# Patient Record
Sex: FEMALE
[De-identification: names, ages, dates, MRNs, and addresses within clinical notes are randomized per-mention and may not be internally consistent; named-entity substitution may affect disease eponyms.]

---

## 2017-12-14 ENCOUNTER — RESULT REVIEW (OUTPATIENT)
Age: 57
End: 2017-12-14

## 2018-02-27 ENCOUNTER — APPOINTMENT (OUTPATIENT)
Dept: INTERNAL MEDICINE | Facility: CLINIC | Age: 58
End: 2018-02-27
Payer: COMMERCIAL

## 2018-02-27 VITALS
RESPIRATION RATE: 12 BRPM | SYSTOLIC BLOOD PRESSURE: 112 MMHG | BODY MASS INDEX: 25.95 KG/M2 | OXYGEN SATURATION: 98 % | HEART RATE: 88 BPM | TEMPERATURE: 100.1 F | WEIGHT: 141 LBS | DIASTOLIC BLOOD PRESSURE: 74 MMHG | HEIGHT: 62 IN

## 2018-02-27 DIAGNOSIS — J06.9 ACUTE UPPER RESPIRATORY INFECTION, UNSPECIFIED: ICD-10-CM

## 2018-02-27 DIAGNOSIS — Z87.891 PERSONAL HISTORY OF NICOTINE DEPENDENCE: ICD-10-CM

## 2018-02-27 PROCEDURE — 99204 OFFICE O/P NEW MOD 45 MIN: CPT

## 2018-02-28 LAB — RAPID RVP RESULT: NOT DETECTED

## 2018-03-05 ENCOUNTER — APPOINTMENT (OUTPATIENT)
Dept: INTERNAL MEDICINE | Facility: CLINIC | Age: 58
End: 2018-03-05
Payer: COMMERCIAL

## 2018-03-05 VITALS
HEIGHT: 62 IN | TEMPERATURE: 97.6 F | OXYGEN SATURATION: 99 % | HEART RATE: 72 BPM | WEIGHT: 145 LBS | BODY MASS INDEX: 26.68 KG/M2 | SYSTOLIC BLOOD PRESSURE: 111 MMHG | RESPIRATION RATE: 12 BRPM | DIASTOLIC BLOOD PRESSURE: 68 MMHG

## 2018-03-05 PROCEDURE — 99214 OFFICE O/P EST MOD 30 MIN: CPT

## 2018-05-03 ENCOUNTER — NON-APPOINTMENT (OUTPATIENT)
Age: 58
End: 2018-05-03

## 2018-05-03 ENCOUNTER — APPOINTMENT (OUTPATIENT)
Dept: CARDIOLOGY | Facility: CLINIC | Age: 58
End: 2018-05-03
Payer: COMMERCIAL

## 2018-05-03 VITALS
HEART RATE: 68 BPM | HEIGHT: 62 IN | RESPIRATION RATE: 12 BRPM | BODY MASS INDEX: 26.5 KG/M2 | DIASTOLIC BLOOD PRESSURE: 59 MMHG | WEIGHT: 144 LBS | OXYGEN SATURATION: 99 % | SYSTOLIC BLOOD PRESSURE: 97 MMHG

## 2018-05-03 DIAGNOSIS — R53.83 OTHER FATIGUE: ICD-10-CM

## 2018-05-03 DIAGNOSIS — Z85.820 PERSONAL HISTORY OF MALIGNANT MELANOMA OF SKIN: ICD-10-CM

## 2018-05-03 DIAGNOSIS — R06.02 SHORTNESS OF BREATH: ICD-10-CM

## 2018-05-03 DIAGNOSIS — Z87.01 PERSONAL HISTORY OF PNEUMONIA (RECURRENT): ICD-10-CM

## 2018-05-03 PROCEDURE — 93000 ELECTROCARDIOGRAM COMPLETE: CPT

## 2018-05-03 PROCEDURE — 99204 OFFICE O/P NEW MOD 45 MIN: CPT | Mod: 25

## 2018-05-03 RX ORDER — HYDROCODONE POLISTIREX AND CHLORPHENIRAMINE POLISTIREX 10; 8 MG/5ML; MG/5ML
10-8 SUSPENSION, EXTENDED RELEASE ORAL
Qty: 1 | Refills: 0 | Status: DISCONTINUED | COMMUNITY
Start: 2018-02-27 | End: 2018-05-03

## 2018-05-03 RX ORDER — LEVOFLOXACIN 500 MG/1
500 TABLET, FILM COATED ORAL
Qty: 7 | Refills: 0 | Status: DISCONTINUED | COMMUNITY
Start: 2018-03-05 | End: 2018-05-03

## 2018-05-03 RX ORDER — BENZONATATE 200 MG/1
200 CAPSULE ORAL
Qty: 20 | Refills: 0 | Status: DISCONTINUED | COMMUNITY
Start: 2018-02-27 | End: 2018-05-03

## 2018-05-03 RX ORDER — LEVOFLOXACIN 500 MG/1
500 TABLET, FILM COATED ORAL
Qty: 7 | Refills: 0 | Status: DISCONTINUED | COMMUNITY
Start: 2018-02-27 | End: 2018-05-03

## 2018-05-04 LAB
25(OH)D3 SERPL-MCNC: 23.9 NG/ML
ALBUMIN SERPL ELPH-MCNC: 4.4 G/DL
ALP BLD-CCNC: 55 U/L
ALT SERPL-CCNC: 24 U/L
ANION GAP SERPL CALC-SCNC: 10 MMOL/L
AST SERPL-CCNC: 34 U/L
BILIRUB SERPL-MCNC: 0.3 MG/DL
BUN SERPL-MCNC: 13 MG/DL
CALCIUM SERPL-MCNC: 9.3 MG/DL
CHLORIDE SERPL-SCNC: 103 MMOL/L
CHOLEST SERPL-MCNC: 245 MG/DL
CHOLEST/HDLC SERPL: 4.4 RATIO
CO2 SERPL-SCNC: 26 MMOL/L
CREAT SERPL-MCNC: 0.89 MG/DL
GLUCOSE SERPL-MCNC: 69 MG/DL
HBA1C MFR BLD HPLC: 5 %
HDLC SERPL-MCNC: 56 MG/DL
LDLC SERPL CALC-MCNC: 159 MG/DL
POTASSIUM SERPL-SCNC: 4.8 MMOL/L
PROT SERPL-MCNC: 8.2 G/DL
SODIUM SERPL-SCNC: 139 MMOL/L
TRIGL SERPL-MCNC: 150 MG/DL
TSH SERPL-ACNC: 3.86 UIU/ML
VIT B12 SERPL-MCNC: 473 PG/ML

## 2018-05-07 LAB
BASOPHILS # BLD AUTO: 0.04 K/UL
BASOPHILS NFR BLD AUTO: 0.8 %
EOSINOPHIL # BLD AUTO: 0.26 K/UL
EOSINOPHIL NFR BLD AUTO: 5.4 %
HCT VFR BLD CALC: 39.7 %
HGB BLD-MCNC: 12.6 G/DL
IMM GRANULOCYTES NFR BLD AUTO: 0.2 %
LYMPHOCYTES # BLD AUTO: 1.95 K/UL
LYMPHOCYTES NFR BLD AUTO: 40.4 %
MAN DIFF?: NORMAL
MCHC RBC-ENTMCNC: 30.3 PG
MCHC RBC-ENTMCNC: 31.7 GM/DL
MCV RBC AUTO: 95.4 FL
MONOCYTES # BLD AUTO: 0.43 K/UL
MONOCYTES NFR BLD AUTO: 8.9 %
NEUTROPHILS # BLD AUTO: 2.14 K/UL
NEUTROPHILS NFR BLD AUTO: 44.3 %
PLATELET # BLD AUTO: 244 K/UL
RBC # BLD: 4.16 M/UL
RBC # FLD: 14 %
WBC # FLD AUTO: 4.83 K/UL

## 2018-08-27 ENCOUNTER — APPOINTMENT (OUTPATIENT)
Dept: INTERNAL MEDICINE | Facility: CLINIC | Age: 58
End: 2018-08-27

## 2018-08-27 VITALS
BODY MASS INDEX: 25.58 KG/M2 | DIASTOLIC BLOOD PRESSURE: 75 MMHG | RESPIRATION RATE: 12 BRPM | OXYGEN SATURATION: 98 % | HEART RATE: 61 BPM | WEIGHT: 139 LBS | SYSTOLIC BLOOD PRESSURE: 112 MMHG | TEMPERATURE: 97.8 F | HEIGHT: 62 IN

## 2018-10-18 ENCOUNTER — RESULT REVIEW (OUTPATIENT)
Age: 58
End: 2018-10-18

## 2018-11-01 ENCOUNTER — RESULT REVIEW (OUTPATIENT)
Age: 58
End: 2018-11-01

## 2020-12-16 PROBLEM — J06.9 ACUTE UPPER RESPIRATORY INFECTION: Status: RESOLVED | Noted: 2018-02-27 | Resolved: 2020-12-16

## 2021-03-18 ENCOUNTER — RESULT REVIEW (OUTPATIENT)
Age: 61
End: 2021-03-18

## 2021-07-02 ENCOUNTER — APPOINTMENT (OUTPATIENT)
Dept: GASTROENTEROLOGY | Facility: CLINIC | Age: 61
End: 2021-07-02
Payer: COMMERCIAL

## 2021-07-02 VITALS
HEIGHT: 64 IN | DIASTOLIC BLOOD PRESSURE: 68 MMHG | TEMPERATURE: 97.2 F | SYSTOLIC BLOOD PRESSURE: 107 MMHG | WEIGHT: 143 LBS | BODY MASS INDEX: 24.41 KG/M2

## 2021-07-02 DIAGNOSIS — R87.619 UNSPECIFIED ABNORMAL CYTOLOGICAL FINDINGS IN SPECIMENS FROM CERVIX UTERI: ICD-10-CM

## 2021-07-02 DIAGNOSIS — R77.8 OTHER SPECIFIED ABNORMALITIES OF PLASMA PROTEINS: ICD-10-CM

## 2021-07-02 DIAGNOSIS — Z12.11 ENCOUNTER FOR SCREENING FOR MALIGNANT NEOPLASM OF COLON: ICD-10-CM

## 2021-07-02 PROCEDURE — 99072 ADDL SUPL MATRL&STAF TM PHE: CPT

## 2021-07-02 PROCEDURE — 99203 OFFICE O/P NEW LOW 30 MIN: CPT

## 2021-07-02 RX ORDER — SODIUM SULFATE, MAGNESIUM SULFATE, AND POTASSIUM CHLORIDE 17.75; 2.7; 2.25 G/1; G/1; G/1
1479-225-188 TABLET ORAL
Qty: 24 | Refills: 0 | Status: ACTIVE | COMMUNITY
Start: 2021-07-02 | End: 1900-01-01

## 2021-07-02 NOTE — ASSESSMENT
[FreeTextEntry1] : 61-year-old female with low haptoglobin referred for colorectal cancer screening.  Average risk for colorectal cancer screening.  There is no rectal bleeding.  She understands the need to undergo Pap smear within 6 months and will follow up with her gynecologist.\par \par She was advised to undergo colonoscopy to which she  agreed. The procedure will be performed in [facility] with the assistance of an anesthesiologist. The procedure was explained in detail and she understood the risks of the procedure not limited  to infection, bleeding, perforation, or non-diagnosis of colon cancer. She  was advised that she could not drive home alone, if the patient chooses to receive sedation. Further diagnostic and treatment recommendations will be based upon the procedure and any biopsies, if they are taken.\par

## 2021-07-02 NOTE — PHYSICAL EXAM
[General Appearance - Alert] : alert [General Appearance - In No Acute Distress] : in no acute distress [Sclera] : the sclera and conjunctiva were normal [PERRL With Normal Accommodation] : pupils were equal in size, round, and reactive to light [Extraocular Movements] : extraocular movements were intact [Outer Ear] : the ears and nose were normal in appearance [Oropharynx] : the oropharynx was normal [Neck Appearance] : the appearance of the neck was normal [Neck Cervical Mass (___cm)] : no neck mass was observed [Jugular Venous Distention Increased] : there was no jugular-venous distention [Thyroid Diffuse Enlargement] : the thyroid was not enlarged [Thyroid Nodule] : there were no palpable thyroid nodules [Auscultation Breath Sounds / Voice Sounds] : lungs were clear to auscultation bilaterally [Heart Rate And Rhythm] : heart rate was normal and rhythm regular [Heart Sounds] : normal S1 and S2 [Heart Sounds Gallop] : no gallops [Murmurs] : no murmurs [Heart Sounds Pericardial Friction Rub] : no pericardial rub [Full Pulse] : the pedal pulses are present [Edema] : there was no peripheral edema [Bowel Sounds] : normal bowel sounds [Abdomen Soft] : soft [Abdomen Tenderness] : non-tender [] : no hepato-splenomegaly [Abdomen Mass (___ Cm)] : no abdominal mass palpated [Cervical Lymph Nodes Enlarged Posterior Bilaterally] : posterior cervical [Cervical Lymph Nodes Enlarged Anterior Bilaterally] : anterior cervical [Supraclavicular Lymph Nodes Enlarged Bilaterally] : supraclavicular [Axillary Lymph Nodes Enlarged Bilaterally] : axillary [Femoral Lymph Nodes Enlarged Bilaterally] : femoral [Inguinal Lymph Nodes Enlarged Bilaterally] : inguinal [No CVA Tenderness] : no ~M costovertebral angle tenderness [No Spinal Tenderness] : no spinal tenderness

## 2021-07-02 NOTE — CONSULT LETTER
[Dear  ___] : Dear  [unfilled], [Consult Letter:] : I had the pleasure of evaluating your patient, [unfilled]. [Please see my note below.] : Please see my note below. [Consult Closing:] : Thank you very much for allowing me to participate in the care of this patient.  If you have any questions, please do not hesitate to contact me. [Sincerely,] : Sincerely, [FreeTextEntry3] : Ang Henley MD, FACP, AGAF, FAASLD\par  of Medicine\par [facility]\par

## 2021-07-02 NOTE — HISTORY OF PRESENT ILLNESS
[FreeTextEntry1] : 61-year-old female with low haptoglobin being followed by Dr. Jacobson for this.  She is due for colorectal cancer screening.  There is no known family history of colorectal cancer.  There is no shortness of breath chest pain, fever, chills nor rectal bleeding.  She completed the COVID-19 vaccination series.  She had a Pap smear recently with STEVEN-1 and she understands the need for follow-up Pap smear within 6 months.

## 2021-07-15 ENCOUNTER — APPOINTMENT (OUTPATIENT)
Dept: GASTROENTEROLOGY | Facility: AMBULATORY SURGERY CENTER | Age: 61
End: 2021-07-15
Payer: COMMERCIAL

## 2021-07-15 PROCEDURE — 45378 DIAGNOSTIC COLONOSCOPY: CPT

## 2023-02-28 ENCOUNTER — OFFICE (OUTPATIENT)
Dept: URBAN - METROPOLITAN AREA CLINIC 90 | Facility: CLINIC | Age: 63
Setting detail: OPHTHALMOLOGY
End: 2023-02-28
Payer: COMMERCIAL

## 2023-02-28 DIAGNOSIS — H52.4: ICD-10-CM

## 2023-02-28 DIAGNOSIS — H11.153: ICD-10-CM

## 2023-02-28 DIAGNOSIS — H25.13: ICD-10-CM

## 2023-02-28 DIAGNOSIS — H43.811: ICD-10-CM

## 2023-02-28 DIAGNOSIS — H52.7: ICD-10-CM

## 2023-02-28 PROCEDURE — 92014 COMPRE OPH EXAM EST PT 1/>: CPT | Performed by: OPHTHALMOLOGY

## 2023-02-28 PROCEDURE — 92015 DETERMINE REFRACTIVE STATE: CPT | Performed by: OPHTHALMOLOGY

## 2023-02-28 ASSESSMENT — REFRACTION_MANIFEST
OS_AXIS: 090
OS_AXIS: 095
OS_AXIS: 090
OS_SPHERE: +3.00
OD_SPHERE: +3.50
OD_CYLINDER: SPH
OD_VA2: 20/20
OD_ADD: +2.50
OD_CYLINDER: SPHERE
OS_VA2: 20/20
OS_ADD: +2.50
OS_SPHERE: +1.25
OD_VA1: 20/20
OS_VA1: 20/20
OU_VA: 20/20
OD_SPHERE: +1.00
OS_VA2: 20/20
OD_SPHERE: +1.00
OD_VA1: 20/20-1
OS_CYLINDER: SPH
OS_SPHERE: +1.50
OS_CYLINDER: -0.50
OD_ADD: +2.50
OD_VA1: 20/20-
OS_VA1: 20/15
OS_VA2: 20/20
OS_VA1: 20/20
OS_ADD: +2.75
OD_SPHERE: +2.00
OD_SPHERE: +1.25
OS_CYLINDER: +0.25
OD_VA1: 20/15
OS_CYLINDER: -0.25
OD_VA2: 20/20
OS_CYLINDER: -0.50
OD_VA1: 20/20
OD_CYLINDER: SPH
OD_VA2: 20/20
OS_CYLINDER: -0.25
OS_VA1: 20/20
OD_ADD: +2.75
OU_VA: 20/20
OD_VA2: 20/20
OS_VA2: 20/20
OD_CYLINDER: +0.25
OS_ADD: +2.50
OS_AXIS: 001
OS_VA1: 20/20
OD_CYLINDER: -0.50
OD_CYLINDER: +0.25
OD_AXIS: 075
OS_SPHERE: +1.00
OD_ADD: +2.75
OS_AXIS: 095
OD_AXIS: 075
OS_ADD: +2.75
OS_SPHERE: +2.25
OD_AXIS: 155
OS_VA1: 20/20
OD_AXIS: 155
OD_CYLINDER: -0.50
OD_ADD: +2.75
OD_VA1: 20/20
OS_ADD: +2.75
OD_SPHERE: +2.25
OS_SPHERE: +1.00
OD_SPHERE: +1.00
OS_SPHERE: +1.25
OS_CYLINDER: SPHERE

## 2023-02-28 ASSESSMENT — REFRACTION_CURRENTRX
OD_VPRISM_DIRECTION: PROGS
OS_AXIS: 080
OD_SPHERE: +1.25
OS_SPHERE: +3.50
OS_VPRISM_DIRECTION: PROGS
OS_CYLINDER: -0.25
OD_AXIS: 158
OD_SPHERE: +3.50
OD_VPRISM_DIRECTION: SV
OS_CYLINDER: SPHERE
OS_SPHERE: +1.75
OD_OVR_VA: 20/
OD_CYLINDER: +0.25
OS_ADD: +1.25
OS_VPRISM_DIRECTION: PROGS
OS_OVR_VA: 20/
OS_ADD: +2.25
OS_OVR_VA: 20/
OD_SPHERE: +2.00
OD_OVR_VA: 20/
OD_VPRISM_DIRECTION: PROGS
OS_OVR_VA: 20/
OS_VPRISM_DIRECTION: SV
OD_ADD: +2.25
OS_SPHERE: +1.25
OD_CYLINDER: SPH
OD_CYLINDER: -0.25
OS_CYLINDER: SPH
OD_ADD: +1.25
OD_OVR_VA: 20/
OD_AXIS: 081

## 2023-02-28 ASSESSMENT — REFRACTION_AUTOREFRACTION
OD_AXIS: 66
OD_SPHERE: +1.75
OD_CYLINDER: -0.25
OS_CYLINDER: -0.75
OS_AXIS: 093
OS_SPHERE: +1.75

## 2023-02-28 ASSESSMENT — SPHEQUIV_DERIVED
OS_SPHEQUIV: 1.375
OS_SPHEQUIV: 1
OD_SPHEQUIV: 3.25
OS_SPHEQUIV: 2
OD_SPHEQUIV: 1.75
OD_SPHEQUIV: 1.625
OD_SPHEQUIV: 1.125
OS_SPHEQUIV: 1.375
OD_SPHEQUIV: 1.125
OS_SPHEQUIV: 2.875
OS_SPHEQUIV: 1.125

## 2023-02-28 ASSESSMENT — VISUAL ACUITY
OS_BCVA: 20/50+1
OD_BCVA: 20/50

## 2023-02-28 ASSESSMENT — CONFRONTATIONAL VISUAL FIELD TEST (CVF)
OS_FINDINGS: FULL
OD_FINDINGS: FULL

## 2024-09-17 ENCOUNTER — OFFICE (OUTPATIENT)
Dept: URBAN - METROPOLITAN AREA CLINIC 90 | Facility: CLINIC | Age: 64
Setting detail: OPHTHALMOLOGY
End: 2024-09-17
Payer: COMMERCIAL

## 2024-09-17 DIAGNOSIS — H01.001: ICD-10-CM

## 2024-09-17 DIAGNOSIS — H25.13: ICD-10-CM

## 2024-09-17 DIAGNOSIS — H01.004: ICD-10-CM

## 2024-09-17 DIAGNOSIS — H01.005: ICD-10-CM

## 2024-09-17 DIAGNOSIS — H01.002: ICD-10-CM

## 2024-09-17 DIAGNOSIS — H11.153: ICD-10-CM

## 2024-09-17 DIAGNOSIS — H43.811: ICD-10-CM

## 2024-09-17 PROCEDURE — 92014 COMPRE OPH EXAM EST PT 1/>: CPT | Performed by: OPHTHALMOLOGY

## 2024-09-17 ASSESSMENT — CONFRONTATIONAL VISUAL FIELD TEST (CVF)
OS_FINDINGS: FULL
OD_FINDINGS: FULL

## 2024-09-17 ASSESSMENT — LID EXAM ASSESSMENTS
OS_BLEPHARITIS: LLL LUL 1+
OD_BLEPHARITIS: RLL RUL 1+